# Patient Record
Sex: FEMALE | URBAN - METROPOLITAN AREA
[De-identification: names, ages, dates, MRNs, and addresses within clinical notes are randomized per-mention and may not be internally consistent; named-entity substitution may affect disease eponyms.]

---

## 2017-04-25 ENCOUNTER — OFFICE VISIT (OUTPATIENT)
Dept: FAMILY MEDICINE | Facility: CLINIC | Age: 57
End: 2017-04-25

## 2017-04-25 VITALS
BODY MASS INDEX: 33.86 KG/M2 | SYSTOLIC BLOOD PRESSURE: 109 MMHG | DIASTOLIC BLOOD PRESSURE: 71 MMHG | WEIGHT: 184 LBS | HEART RATE: 65 BPM | HEIGHT: 62 IN

## 2017-04-25 DIAGNOSIS — G47.09 OTHER INSOMNIA: ICD-10-CM

## 2017-04-25 DIAGNOSIS — R52 GENERALIZED PAIN: ICD-10-CM

## 2017-04-25 DIAGNOSIS — I10 ESSENTIAL HYPERTENSION: ICD-10-CM

## 2017-04-25 DIAGNOSIS — Z00.00 HEALTH CARE MAINTENANCE: Primary | ICD-10-CM

## 2017-04-25 DIAGNOSIS — F32.89 OTHER DEPRESSION: ICD-10-CM

## 2017-04-25 DIAGNOSIS — J34.89 SINUS PRESSURE: ICD-10-CM

## 2017-04-25 RX ORDER — HYDROCHLOROTHIAZIDE 25 MG/1
25 TABLET ORAL DAILY
Qty: 90 TABLET | Refills: 3 | Status: SHIPPED | OUTPATIENT
Start: 2017-04-25

## 2017-04-25 RX ORDER — VALSARTAN 80 MG/1
80 TABLET ORAL DAILY
Qty: 90 TABLET | Refills: 3 | Status: SHIPPED | OUTPATIENT
Start: 2017-04-25

## 2017-04-25 RX ORDER — IBUPROFEN 800 MG/1
800 TABLET, FILM COATED ORAL EVERY 8 HOURS PRN
Qty: 90 TABLET | Refills: 3 | Status: SHIPPED | OUTPATIENT
Start: 2017-04-25

## 2017-04-25 RX ORDER — PHENYLEPHRINE HCL 10 MG/1
10 TABLET, FILM COATED ORAL 4 TIMES DAILY PRN
Qty: 25 TABLET | Refills: 3 | Status: SHIPPED | OUTPATIENT
Start: 2017-04-25

## 2017-04-25 RX ORDER — SERTRALINE HYDROCHLORIDE 100 MG/1
150 TABLET, FILM COATED ORAL DAILY
Qty: 90 TABLET | Refills: 3 | Status: SHIPPED | OUTPATIENT
Start: 2017-04-25

## 2017-04-25 RX ORDER — SERTRALINE HYDROCHLORIDE 100 MG/1
150 TABLET, FILM COATED ORAL DAILY
Qty: 90 TABLET | Refills: 3 | COMMUNITY
Start: 2017-04-25 | End: 2017-04-25

## 2017-04-25 NOTE — MR AVS SNAPSHOT
"              After Visit Summary   4/25/2017    Justina Ramirez    MRN: [id number]           Patient Information     Date Of Birth          1960        Visit Information        Provider Department      4/25/2017 2:20 PM Pamela Villarreal MD [facility]        Today's Diagnoses     Health care maintenance    -  1    Other insomnia        Other depression        Essential hypertension        Generalized pain        Sinus pressure           Follow-ups after your visit        Who to contact     Please call your clinic at [phone] to:    Ask questions about your health    Make or cancel appointments    Discuss your medicines    Learn about your test results    Speak to your doctor   If you have compliments or concerns about an experience at your clinic, or if you wish to file a complaint, please contact [facility] Patient Relations at [phone] or email us at [email or web address]         Additional Information About Your Visit        Care EveryWhere ID     This is your Care EveryWhere ID. This could be used by other organizations to access your [facility] medical records  [id number]        Your Vitals Were     Pulse Height BMI (Body Mass Index)             65 5' 2\" (157.5 cm) 33.65 kg/m2          Blood Pressure from Last 3 Encounters:   04/25/17 109/71    Weight from Last 3 Encounters:   04/25/17 184 lb (83.5 kg)              We Performed the Following     Hepatitis A Antibody IgG     Hepatitis A antibody IgM     Hepatitis B Surface Antibody     Hepatitis B surface antigen     Hepatitis C antibody     HIV Antigen Antibody Combo     M Tuberculosis by Quantiferon          Today's Medication Changes          These changes are accurate as of: 4/25/17  3:56 PM.  If you have any questions, ask your nurse or doctor.               Start taking these medicines.        Dose/Directions    hydrochlorothiazide 25 MG tablet   Commonly known as:  HYDRODIURIL "   Used for:  Essential hypertension   Started by:  Pamela Villarreal MD        Dose:  25 mg   Take 1 tablet (25 mg) by mouth daily   Quantity:  90 tablet   Refills:  3       ibuprofen 800 MG tablet   Commonly known as:  ADVIL/MOTRIN   Used for:  Generalized pain   Started by:  Pamela Villarreal MD        Dose:  800 mg   Take 1 tablet (800 mg) by mouth every 8 hours as needed for moderate pain   Quantity:  90 tablet   Refills:  3       melatonin 5 MG tablet   Used for:  Other insomnia   Started by:  Pamela Villarreal MD        Dose:  5-10 mg   Take 1-2 tablets (5-10 mg) by mouth nightly as needed for sleep   Quantity:  60 tablet   Refills:  11       phenylephrine HCl 10 MG Tabs   Used for:  Sinus pressure   Started by:  Pamela Villarreal MD        Dose:  10 mg   Take 10 mg by mouth 4 times daily as needed   Quantity:  25 tablet   Refills:  3       valsartan 80 MG tablet   Commonly known as:  DIOVAN   Used for:  Essential hypertension   Started by:  Pamela Villarreal MD        Dose:  80 mg   Take 1 tablet (80 mg) by mouth daily   Quantity:  90 tablet   Refills:  3            Where to get your medicines      These medications [place] [facility] [place] [address]  [address], [place] [address]     Phone:  [phone]     hydrochlorothiazide 25 MG tablet    ibuprofen 800 MG tablet    melatonin 5 MG tablet    phenylephrine HCl 10 MG Tabs    sertraline 100 MG tablet    valsartan 80 MG tablet                Primary Care Provider Office Phone # Fax #    Pamela Villarreal [phone]       [facility] [address] [facility] [address]        Thank you!     Thank you for choosing [facility]  for your care. Our goal is always to provide you with excellent care. Hearing back from our patients is one way we can continue to improve our services. Please take a few minutes to complete the written survey that you may receive in the  mail after your visit with us. Thank you!             Your Updated Medication List - Protect others around you: Learn how to safely use, store and throw away your medicines at www.disposemymeds.org.          This list is accurate as of: 4/25/17  3:56 PM.  Always use your most recent med list.                   Brand Name Dispense Instructions for use    hydrochlorothiazide 25 MG tablet    HYDRODIURIL    90 tablet    Take 1 tablet (25 mg) by mouth daily       ibuprofen 800 MG tablet    ADVIL/MOTRIN    90 tablet    Take 1 tablet (800 mg) by mouth every 8 hours as needed for moderate pain       melatonin 5 MG tablet     60 tablet    Take 1-2 tablets (5-10 mg) by mouth nightly as needed for sleep       phenylephrine HCl 10 MG Tabs     25 tablet    Take 10 mg by mouth 4 times daily as needed       sertraline 100 MG tablet    ZOLOFT    90 tablet    Take 1.5 tablets (150 mg) by mouth daily       valsartan 80 MG tablet    DIOVAN    90 tablet    Take 1 tablet (80 mg) by mouth daily

## 2017-04-25 NOTE — PROGRESS NOTES
"       HPI       Justina Ramirez is a 57 year old  female  who presents for new visit. [place] teen challenge.  She has had a Normal colonoscopy at 48 , repeat was due for 58. She is in adult teen challenge for substance abuse. She also has a history of depression, insomnia, HTN. She presents with charts related to her previous health care.       There are no active problems to display for this patient.      No current outpatient prescriptions on file.          Allergies   Allergen Reactions     Atorvastatin Muscle Pain (Myalgia)     Lisinopril Cough     Sulfa Drugs              +++++++      Problem, Medication and Allergy Lists were reviewed and updated if needed..    Patient is an established patient of this clinic..         Review of Systems:   General: No distress  HEENT: No sore throat  Pulm: No shortness of breath, no cough  CVS: No chest pain, no palpitations  GI: No abdominal pain, nausea, vomiting or diarrhea   : No dysuria  MSK: No back pain  Skin: No new rashes  Neuro: No headache, no dizziness,               Physical Exam:     Vitals:    04/25/17 1525   BP: 109/71   Pulse: 65   Weight: 184 lb (83.5 kg)   Height: 5' 2\" (157.5 cm)     Body mass index is 33.65 kg/(m^2).    Constitutional: Awake, alert, cooperative, no apparent distress, and appears stated age  HEENT: MMM, no conjunctival pallor  Pulm: CTAB, No rales, No wheeze, no increased effort of breathing.  CVS: RRR, No murmurs   GI: S NT ND BS +ve  Skin : no  rash  Neuro:  No focal neurological deficit  Psych : Good eye contact, well groomed, very interactive and collaborative. Good insight. Thought process is linear and coherent. Associations are tight. Mood is good. Affect euthymic.         No results found for this or any previous visit (from the past 24 hour(s)).    Assessment and Plan      1. Health care maintenance- ordered adult Teen challenge labs.   - HIV Antigen Antibody Combo  - Hepatitis A Antibody IgG  - Hepatitis A antibody IgM  - " Hepatitis B Surface Antibody  - Hepatitis B surface antigen  - Hepatitis C antibody  - M Tuberculosis by Quantiferon    2. Other insomnia- refilled per patient's chart, did well on melatonin in the past.     - melatonin 5 MG tablet; Take 1-2 tablets (5-10 mg) by mouth nightly as needed for sleep  Dispense: 60 tablet; Refill: 11    3. Other depression- refilled per review of patient's charts she presented to clinic with. Scanned charts into chart.    - sertraline (ZOLOFT) 100 MG tablet; Take 1.5 tablets (150 mg) by mouth daily  Dispense: 90 tablet; Refill: 3    4. Essential hypertension. BP at goal today. Refilled.     - valsartan (DIOVAN) 80 MG tablet; Take 1 tablet (80 mg) by mouth daily  Dispense: 90 tablet; Refill: 3  - hydrochlorothiazide (HYDRODIURIL) 25 MG tablet; Take 1 tablet (25 mg) by mouth daily  Dispense: 90 tablet; Refill: 3    5. Generalized pain    - ibuprofen (ADVIL/MOTRIN) 800 MG tablet; Take 1 tablet (800 mg) by mouth every 8 hours as needed for moderate pain  Dispense: 90 tablet; Refill: 3    6. Sinus pressure  - phenylephrine HCl 10 MG TABS; Take 10 mg by mouth 4 times daily as needed  Dispense: 25 tablet; Refill: 3      There are no discontinued medications.    Options for treatment and follow-up care were reviewed with the patient. Justina Ramirez  engaged in the decision making process and verbalized understanding of the options discussed and agreed with the final plan.    Pamela Villarreal MD G3  [facility]  Family Medicine Resident  Pager [phone]

## 2017-04-25 NOTE — PROGRESS NOTES
Preceptor Attestation:   Patient seen and discussed with the resident. Assessment and plan reviewed with resident and agreed upon.   Supervising Physician:  Sigrid Tucker MD  [facility]

## 2017-04-26 LAB
HAV IGG SER QL IA: ABNORMAL
HAV IGM SERPL QL IA: NORMAL
HBV SURFACE AB SERPL IA-ACNC: 20.16 M[IU]/ML
HBV SURFACE AG SERPL QL IA: NONREACTIVE
HCV AB SERPL QL IA: NORMAL
HIV 1+2 AB+HIV1 P24 AG SERPL QL IA: NORMAL

## 2017-04-27 LAB
M TB TUBERC IFN-G BLD QL: NEGATIVE
M TB TUBERC IFN-G/MITOGEN IGNF BLD: 0 IU/ML

## 2018-01-21 ENCOUNTER — HEALTH MAINTENANCE LETTER (OUTPATIENT)
Age: 58
End: 2018-01-21